# Patient Record
Sex: MALE | Race: BLACK OR AFRICAN AMERICAN | Employment: FULL TIME | URBAN - METROPOLITAN AREA
[De-identification: names, ages, dates, MRNs, and addresses within clinical notes are randomized per-mention and may not be internally consistent; named-entity substitution may affect disease eponyms.]

---

## 2024-01-30 RX ORDER — AMLODIPINE BESYLATE 5 MG/1
5 TABLET ORAL EVERY MORNING
COMMUNITY

## 2024-01-30 RX ORDER — SPIRONOLACTONE 25 MG/1
25 TABLET ORAL DAILY
COMMUNITY

## 2024-02-02 NOTE — H&P
History and Physical    Patient: Sumeet Mitchell  Medical Record Number: [id number]    [facility]   Sumeet Mitchell is a 68 year old male who presents with chronic right shoulder pain.  Patient enjoys working out and playing sports.  Pain began 4 years ago.  No injury.  Pain worse with overhead and behind back activities.  Pain at night causing him not to sleep occasionally.  Decreased range of motion is what bothers him the most.  He is also unable to do activities in the gym because of weakness and decreased range of motion.     Treatment: Cortisone injection 6/23/2023 provided 80% relief for 2 months.  He reports since his last visit he has lost a lot of range of motion. He needs to be able to climb a ladder for his job, and he is not able to do that at this time.    REVIEW OF SYSTEMS   GENERAL HEALTH: Denies fevers, chills, recent weight loss, unremitting night pain, difficulty sleeping, fatigue   SKIN: Denies history of skin rashes, easy bruising, swollen ankles   EYES: Denies blurred vision, double vision, or changes in vision   HEENT: Denies swallowing or hearing difficulties   RESPIRATORY: Denies shortness of breath  CARDIOVASCULAR: Denies chest pain or palpitations   GI: Denies nausea, vomiting, heartburn, constipation, diarrhea, rectal bleeding, melena  : Denies dysuria, urgency, frequency, hematuria, increase in night urination, inability to completely empty bladder   NEURO: Denies balance problems, seizures, increased clumsiness, dropping things, tremor, headaches/ migraines   PSYCH: Denies depression, no anxiety.   HEMATOLOGY: Denies history of bruising, excessive bleeding, or anemia  ENDOCRINE: Denies excessive thirst or urination, unexpected weight changes  MUSCULOSKELETAL: As per HPI  ALLERGY/IMM.: Denies food or seasonal allergies     PMH     Past Medical History:   Diagnosis Date    Diabetes (HCC)     Heart attack (HCC)     Sleep apnea     uses cpap      Current Outpatient Medications   Medication Sig  Dispense Refill    ACETAMINOPHEN EXTRA STRENGTH 500 MG Oral Tab Take 1,000 mg by mouth every 6 (six) hours as needed.      albuterol 108 (90 Base) MCG/ACT Inhalation Aero Soln       amLODIPine 5 MG Oral Tab       aspirin 81 MG Oral Chew Tab Chew 81 mg by mouth daily.      cyclobenzaprine 10 MG Oral Tab Take 1 tablet by mouth 3 (three) times daily as needed.      SOLIQUA 100-33 UNT-MCG/ML Subcutaneous Solution Pen-injector       spironolactone 25 MG Oral Tab Take 25 mg by mouth daily.      ticagrelor 60 MG Oral Tab Take 1 tablet by mouth Q12H.      atorvastatin 80 MG Oral Tab Take 1 tablet by mouth every evening.         No Known Allergies   Past Surgical History:   Procedure Laterality Date    CATH BARE METAL STENT (BMS)      PATIENT DENIES ANY SURGICAL HISTORY        Social History    Tobacco Use      Smoking status: Not on file      Smokeless tobacco: Not on file    Alcohol use: Not on file    Drug use: Not on file       EXAM   General: alert and oriented x 3, well developed, well nourished and in no apparent distress  Head/neck: Normocephalic, atraumatic  Eyes: EOMI  Skin: Skin intact. No lacerations, abrasions  Cardiac: RRR. S1 S2. No murmurs, rubs, gallops  Pulmonary: Normal respirations. CTA bilaterally  MSK: Right Upper Extremity:  No erythema/induraiton, no ecchymosis, no Atrophy.  Tender to palpation over the biceps. None over AC joint, lateral acromion.  Range of motion:  , ER 45,  IR back pocket (C/L 170/70/L1).  Rotator cuff testin-/5 SS, 5-/5 IS, 5/5 SSc, 5/5 TM.  Vascular: Pulses intact with good perfusion  Neurologic: Sensation intact. Strength 5/5  Psychiatric: Normal mood and affect. Cooperative    IMAGING   X-rays taken in clinic today reviewed with the patient show bone-on-bone osteoarthritic changes of the glenohumeral joint.  Large osteophytes around the humeral head.  No high rising humeral head.  No fractures or dislocations    MRI JOINT UPPER EXT RIGHT WO CONTRAST Impression    1.   Advanced osteoarthrosis of the glenohumeral joint and mild to moderate degree osteoarthrosis of the acromioclavicular joint as fully discussed above.     2.  Small curvilinear high-grade interstitial partial-thickness tear of anterior insertional fibers of the supraspinatus tendon at the footprint.     3.  Moderate to severe tendinosis of the subscapularis tendon.     4.  Mild to moderate degree tendinosis of the intra-articular portion of the long head of the biceps tendon.       ASSESSMENT AND PLAN   Diagnosis:  Arthritis of right glenohumeral joint, Non-traumatic rotator cuff tear, right    Discussion & Treatment Plan:  We discussed conservative/non-operative treatment options, including: activity modification, bracing, analgesics, home exercise program, physical/occupational therapy, corticosteroid injection. Due to the physical exam findings, imaging, severity and longevity of symptoms as well as the lack of improvement with conservative treatment, and after reviewing the risk/benefit and alternatives to treatment options above, the patient has decided to proceed with surgery. Risks and benefits of the procedure were discussed. All questions answered.  Patient understands and agrees with all aspects of the above plan.    Procedure:  Right reverse total shoulder arthroplasty    Dr. Escobar Real  H&P prepared by LORENE Aranda

## 2024-02-05 ENCOUNTER — ANESTHESIA EVENT (OUTPATIENT)
Dept: SURGERY | Facility: HOSPITAL | Age: 69
End: 2024-02-05
Payer: COMMERCIAL

## 2024-02-06 ENCOUNTER — HOSPITAL ENCOUNTER (OUTPATIENT)
Facility: HOSPITAL | Age: 69
Setting detail: HOSPITAL OUTPATIENT SURGERY
Discharge: HOME | End: 2024-02-06
Attending: ORTHOPAEDIC SURGERY | Admitting: ORTHOPAEDIC SURGERY
Payer: COMMERCIAL

## 2024-02-06 ENCOUNTER — ANESTHESIA (OUTPATIENT)
Dept: SURGERY | Facility: HOSPITAL | Age: 69
End: 2024-02-06
Payer: COMMERCIAL

## 2024-02-06 ENCOUNTER — APPOINTMENT (OUTPATIENT)
Dept: GENERAL RADIOLOGY | Facility: HOSPITAL | Age: 69
End: 2024-02-06
Attending: NURSE PRACTITIONER
Payer: COMMERCIAL

## 2024-02-06 VITALS
TEMPERATURE: 98 F | WEIGHT: 221 LBS | SYSTOLIC BLOOD PRESSURE: 127 MMHG | DIASTOLIC BLOOD PRESSURE: 69 MMHG | RESPIRATION RATE: 16 BRPM | HEIGHT: 69 IN | BODY MASS INDEX: 32.73 KG/M2 | OXYGEN SATURATION: 92 % | HEART RATE: 96 BPM

## 2024-02-06 DIAGNOSIS — M12.811 ROTATOR CUFF TEAR ARTHROPATHY OF RIGHT SHOULDER: Primary | ICD-10-CM

## 2024-02-06 DIAGNOSIS — M75.101 ROTATOR CUFF TEAR ARTHROPATHY OF RIGHT SHOULDER: Primary | ICD-10-CM

## 2024-02-06 LAB
GLUCOSE BLDC GLUCOMTR-MCNC: 142 MG/DL (ref 70–99)
GLUCOSE BLDC GLUCOMTR-MCNC: 187 MG/DL (ref 70–99)

## 2024-02-06 PROCEDURE — 88311 DECALCIFY TISSUE: CPT | Performed by: ORTHOPAEDIC SURGERY

## 2024-02-06 PROCEDURE — 97535 SELF CARE MNGMENT TRAINING: CPT

## 2024-02-06 PROCEDURE — 97165 OT EVAL LOW COMPLEX 30 MIN: CPT

## 2024-02-06 PROCEDURE — 73030 X-RAY EXAM OF SHOULDER: CPT | Performed by: NURSE PRACTITIONER

## 2024-02-06 PROCEDURE — 82962 GLUCOSE BLOOD TEST: CPT

## 2024-02-06 PROCEDURE — 0RRJ00Z REPLACEMENT OF RIGHT SHOULDER JOINT WITH REVERSE BALL AND SOCKET SYNTHETIC SUBSTITUTE, OPEN APPROACH: ICD-10-PCS | Performed by: ORTHOPAEDIC SURGERY

## 2024-02-06 PROCEDURE — 88305 TISSUE EXAM BY PATHOLOGIST: CPT | Performed by: ORTHOPAEDIC SURGERY

## 2024-02-06 PROCEDURE — 64415 NJX AA&/STRD BRCH PLXS IMG: CPT | Performed by: ORTHOPAEDIC SURGERY

## 2024-02-06 DEVICE — IMPLANTABLE DEVICE: Type: IMPLANTABLE DEVICE | Site: SHOULDER | Status: FUNCTIONAL

## 2024-02-06 DEVICE — SCREW BN 36MM 4.5MM PERI STRL: Type: IMPLANTABLE DEVICE | Site: SHOULDER | Status: FUNCTIONAL

## 2024-02-06 DEVICE — IMPLANTABLE DEVICE: Type: IMPLANTABLE DEVICE | Site: SHOULDER

## 2024-02-06 DEVICE — SCREW BN 40MM 4.5MM PERI STRL: Type: IMPLANTABLE DEVICE | Site: SHOULDER | Status: FUNCTIONAL

## 2024-02-06 DEVICE — SCREW BN 20MM 5.5MM LOCK PERI: Type: IMPLANTABLE DEVICE | Site: SHOULDER | Status: FUNCTIONAL

## 2024-02-06 DEVICE — SCREW BN 16MM 5.5MM LOCK PERI: Type: IMPLANTABLE DEVICE | Site: SHOULDER | Status: FUNCTIONAL

## 2024-02-06 RX ORDER — OXYCODONE HYDROCHLORIDE 5 MG/1
5 TABLET ORAL EVERY 4 HOURS PRN
OUTPATIENT
Start: 2024-02-06 | End: 2024-02-07

## 2024-02-06 RX ORDER — OXYCODONE HYDROCHLORIDE 5 MG/1
5 TABLET ORAL EVERY 4 HOURS PRN
Status: DISCONTINUED | OUTPATIENT
Start: 2024-02-06 | End: 2024-02-06

## 2024-02-06 RX ORDER — METOPROLOL TARTRATE 1 MG/ML
INJECTION, SOLUTION INTRAVENOUS AS NEEDED
Status: DISCONTINUED | OUTPATIENT
Start: 2024-02-06 | End: 2024-02-06 | Stop reason: SURG

## 2024-02-06 RX ORDER — SODIUM CHLORIDE, SODIUM LACTATE, POTASSIUM CHLORIDE, CALCIUM CHLORIDE 600; 310; 30; 20 MG/100ML; MG/100ML; MG/100ML; MG/100ML
INJECTION, SOLUTION INTRAVENOUS CONTINUOUS
Status: DISCONTINUED | OUTPATIENT
Start: 2024-02-06 | End: 2024-02-06

## 2024-02-06 RX ORDER — FAMOTIDINE 20 MG/1
20 TABLET, FILM COATED ORAL ONCE
Status: COMPLETED | OUTPATIENT
Start: 2024-02-06 | End: 2024-02-06

## 2024-02-06 RX ORDER — METOCLOPRAMIDE 10 MG/1
10 TABLET ORAL ONCE
Status: COMPLETED | OUTPATIENT
Start: 2024-02-06 | End: 2024-02-06

## 2024-02-06 RX ORDER — NALOXONE HYDROCHLORIDE 0.4 MG/ML
0.08 INJECTION, SOLUTION INTRAMUSCULAR; INTRAVENOUS; SUBCUTANEOUS AS NEEDED
Status: DISCONTINUED | OUTPATIENT
Start: 2024-02-06 | End: 2024-02-06

## 2024-02-06 RX ORDER — MORPHINE SULFATE 4 MG/ML
2 INJECTION, SOLUTION INTRAMUSCULAR; INTRAVENOUS EVERY 2 HOUR PRN
OUTPATIENT
Start: 2024-02-06 | End: 2024-02-07

## 2024-02-06 RX ORDER — LIDOCAINE HYDROCHLORIDE 10 MG/ML
INJECTION, SOLUTION EPIDURAL; INFILTRATION; INTRACAUDAL; PERINEURAL AS NEEDED
Status: DISCONTINUED | OUTPATIENT
Start: 2024-02-06 | End: 2024-02-06 | Stop reason: SURG

## 2024-02-06 RX ORDER — ASPIRIN 81 MG/1
81 TABLET ORAL 2 TIMES DAILY
Qty: 42 TABLET | Refills: 0 | Status: SHIPPED | OUTPATIENT
Start: 2024-02-06 | End: 2024-02-27

## 2024-02-06 RX ORDER — ACETAMINOPHEN 500 MG
1000 TABLET ORAL ONCE
Status: COMPLETED | OUTPATIENT
Start: 2024-02-06 | End: 2024-02-06

## 2024-02-06 RX ORDER — TRANEXAMIC ACID 10 MG/ML
INJECTION, SOLUTION INTRAVENOUS AS NEEDED
Status: DISCONTINUED | OUTPATIENT
Start: 2024-02-06 | End: 2024-02-06 | Stop reason: SURG

## 2024-02-06 RX ORDER — DOCUSATE SODIUM 100 MG/1
100 CAPSULE, LIQUID FILLED ORAL 2 TIMES DAILY
Status: DISCONTINUED | OUTPATIENT
Start: 2024-02-06 | End: 2024-02-06

## 2024-02-06 RX ORDER — DEXTROSE MONOHYDRATE 25 G/50ML
50 INJECTION, SOLUTION INTRAVENOUS
Status: DISCONTINUED | OUTPATIENT
Start: 2024-02-06 | End: 2024-02-06

## 2024-02-06 RX ORDER — DEXAMETHASONE SODIUM PHOSPHATE 10 MG/ML
INJECTION, SOLUTION INTRAMUSCULAR; INTRAVENOUS
Status: COMPLETED | OUTPATIENT
Start: 2024-02-06 | End: 2024-02-06

## 2024-02-06 RX ORDER — PHENYLEPHRINE HCL 10 MG/ML
VIAL (ML) INJECTION AS NEEDED
Status: DISCONTINUED | OUTPATIENT
Start: 2024-02-06 | End: 2024-02-06 | Stop reason: SURG

## 2024-02-06 RX ORDER — LIDOCAINE HYDROCHLORIDE 10 MG/ML
INJECTION, SOLUTION INFILTRATION; PERINEURAL
Status: COMPLETED | OUTPATIENT
Start: 2024-02-06 | End: 2024-02-06

## 2024-02-06 RX ORDER — DOCUSATE SODIUM 100 MG/1
100 CAPSULE, LIQUID FILLED ORAL 2 TIMES DAILY
Qty: 60 CAPSULE | Refills: 0 | Status: SHIPPED | OUTPATIENT
Start: 2024-02-06

## 2024-02-06 RX ORDER — ROCURONIUM BROMIDE 10 MG/ML
INJECTION, SOLUTION INTRAVENOUS AS NEEDED
Status: DISCONTINUED | OUTPATIENT
Start: 2024-02-06 | End: 2024-02-06 | Stop reason: SURG

## 2024-02-06 RX ORDER — MORPHINE SULFATE 4 MG/ML
6 INJECTION, SOLUTION INTRAMUSCULAR; INTRAVENOUS EVERY 2 HOUR PRN
OUTPATIENT
Start: 2024-02-06 | End: 2024-02-07

## 2024-02-06 RX ORDER — NICOTINE POLACRILEX 4 MG
30 LOZENGE BUCCAL
Status: DISCONTINUED | OUTPATIENT
Start: 2024-02-06 | End: 2024-02-06

## 2024-02-06 RX ORDER — CEFAZOLIN SODIUM/WATER 2 G/20 ML
2 SYRINGE (ML) INTRAVENOUS EVERY 8 HOURS
Qty: 40 ML | Refills: 0 | Status: DISCONTINUED | OUTPATIENT
Start: 2024-02-06 | End: 2024-02-06

## 2024-02-06 RX ORDER — HYDROMORPHONE HYDROCHLORIDE 1 MG/ML
0.2 INJECTION, SOLUTION INTRAMUSCULAR; INTRAVENOUS; SUBCUTANEOUS EVERY 5 MIN PRN
Status: DISCONTINUED | OUTPATIENT
Start: 2024-02-06 | End: 2024-02-06

## 2024-02-06 RX ORDER — ASPIRIN 81 MG/1
81 TABLET ORAL 2 TIMES DAILY
Status: DISCONTINUED | OUTPATIENT
Start: 2024-02-06 | End: 2024-02-06

## 2024-02-06 RX ORDER — BUPIVACAINE HYDROCHLORIDE AND EPINEPHRINE 5; 5 MG/ML; UG/ML
INJECTION, SOLUTION PERINEURAL AS NEEDED
Status: DISCONTINUED | OUTPATIENT
Start: 2024-02-06 | End: 2024-02-06 | Stop reason: HOSPADM

## 2024-02-06 RX ORDER — ONDANSETRON 2 MG/ML
INJECTION INTRAMUSCULAR; INTRAVENOUS AS NEEDED
Status: DISCONTINUED | OUTPATIENT
Start: 2024-02-06 | End: 2024-02-06 | Stop reason: SURG

## 2024-02-06 RX ORDER — OXYCODONE HYDROCHLORIDE 5 MG/1
2.5 TABLET ORAL EVERY 4 HOURS PRN
Status: DISCONTINUED | OUTPATIENT
Start: 2024-02-06 | End: 2024-02-06

## 2024-02-06 RX ORDER — HYDROMORPHONE HYDROCHLORIDE 1 MG/ML
0.6 INJECTION, SOLUTION INTRAMUSCULAR; INTRAVENOUS; SUBCUTANEOUS EVERY 5 MIN PRN
Status: DISCONTINUED | OUTPATIENT
Start: 2024-02-06 | End: 2024-02-06

## 2024-02-06 RX ORDER — CEFAZOLIN SODIUM/WATER 2 G/20 ML
2 SYRINGE (ML) INTRAVENOUS ONCE
Status: COMPLETED | OUTPATIENT
Start: 2024-02-06 | End: 2024-02-06

## 2024-02-06 RX ORDER — OXYCODONE HYDROCHLORIDE 5 MG/1
10 TABLET ORAL EVERY 4 HOURS PRN
OUTPATIENT
Start: 2024-02-06 | End: 2024-02-07

## 2024-02-06 RX ORDER — ACETAMINOPHEN 500 MG
1000 TABLET ORAL EVERY 8 HOURS
OUTPATIENT
Start: 2024-02-06 | End: 2024-02-07

## 2024-02-06 RX ORDER — ROPIVACAINE HYDROCHLORIDE 5 MG/ML
INJECTION, SOLUTION EPIDURAL; INFILTRATION; PERINEURAL
Status: COMPLETED | OUTPATIENT
Start: 2024-02-06 | End: 2024-02-06

## 2024-02-06 RX ORDER — NICOTINE POLACRILEX 4 MG
15 LOZENGE BUCCAL
Status: DISCONTINUED | OUTPATIENT
Start: 2024-02-06 | End: 2024-02-06

## 2024-02-06 RX ORDER — VANCOMYCIN HYDROCHLORIDE 1 G/20ML
INJECTION, POWDER, LYOPHILIZED, FOR SOLUTION INTRAVENOUS AS NEEDED
Status: DISCONTINUED | OUTPATIENT
Start: 2024-02-06 | End: 2024-02-06 | Stop reason: HOSPADM

## 2024-02-06 RX ORDER — SENNOSIDES 8.6 MG
17.2 TABLET ORAL NIGHTLY
Status: DISCONTINUED | OUTPATIENT
Start: 2024-02-06 | End: 2024-02-06

## 2024-02-06 RX ORDER — MORPHINE SULFATE 4 MG/ML
4 INJECTION, SOLUTION INTRAMUSCULAR; INTRAVENOUS EVERY 10 MIN PRN
Status: DISCONTINUED | OUTPATIENT
Start: 2024-02-06 | End: 2024-02-06

## 2024-02-06 RX ORDER — HYDROMORPHONE HYDROCHLORIDE 1 MG/ML
0.4 INJECTION, SOLUTION INTRAMUSCULAR; INTRAVENOUS; SUBCUTANEOUS EVERY 5 MIN PRN
Status: DISCONTINUED | OUTPATIENT
Start: 2024-02-06 | End: 2024-02-06

## 2024-02-06 RX ORDER — MORPHINE SULFATE 15 MG/1
15 TABLET ORAL EVERY 4 HOURS PRN
OUTPATIENT
Start: 2024-02-06 | End: 2024-02-07

## 2024-02-06 RX ORDER — FAMOTIDINE 10 MG/ML
20 INJECTION, SOLUTION INTRAVENOUS ONCE
Status: COMPLETED | OUTPATIENT
Start: 2024-02-06 | End: 2024-02-06

## 2024-02-06 RX ORDER — MORPHINE SULFATE 4 MG/ML
4 INJECTION, SOLUTION INTRAMUSCULAR; INTRAVENOUS EVERY 2 HOUR PRN
OUTPATIENT
Start: 2024-02-06 | End: 2024-02-07

## 2024-02-06 RX ORDER — MIDAZOLAM HYDROCHLORIDE 1 MG/ML
INJECTION INTRAMUSCULAR; INTRAVENOUS
Status: COMPLETED | OUTPATIENT
Start: 2024-02-06 | End: 2024-02-06

## 2024-02-06 RX ORDER — MORPHINE SULFATE 10 MG/ML
6 INJECTION, SOLUTION INTRAMUSCULAR; INTRAVENOUS EVERY 10 MIN PRN
Status: DISCONTINUED | OUTPATIENT
Start: 2024-02-06 | End: 2024-02-06

## 2024-02-06 RX ORDER — DEXAMETHASONE SODIUM PHOSPHATE 4 MG/ML
VIAL (ML) INJECTION AS NEEDED
Status: DISCONTINUED | OUTPATIENT
Start: 2024-02-06 | End: 2024-02-06 | Stop reason: SURG

## 2024-02-06 RX ORDER — METOCLOPRAMIDE HYDROCHLORIDE 5 MG/ML
10 INJECTION INTRAMUSCULAR; INTRAVENOUS ONCE
Status: COMPLETED | OUTPATIENT
Start: 2024-02-06 | End: 2024-02-06

## 2024-02-06 RX ORDER — HYDROCODONE BITARTRATE AND ACETAMINOPHEN 10; 325 MG/1; MG/1
1 TABLET ORAL EVERY 6 HOURS PRN
Qty: 28 TABLET | Refills: 0 | Status: SHIPPED | OUTPATIENT
Start: 2024-02-06 | End: 2024-02-13

## 2024-02-06 RX ORDER — ONDANSETRON 4 MG/1
4 TABLET, ORALLY DISINTEGRATING ORAL EVERY 8 HOURS PRN
Qty: 15 TABLET | Refills: 0 | Status: SHIPPED | OUTPATIENT
Start: 2024-02-06 | End: 2024-02-11

## 2024-02-06 RX ORDER — ONDANSETRON 2 MG/ML
4 INJECTION INTRAMUSCULAR; INTRAVENOUS EVERY 6 HOURS PRN
Status: DISCONTINUED | OUTPATIENT
Start: 2024-02-06 | End: 2024-02-06

## 2024-02-06 RX ORDER — MORPHINE SULFATE 4 MG/ML
2 INJECTION, SOLUTION INTRAMUSCULAR; INTRAVENOUS EVERY 10 MIN PRN
Status: DISCONTINUED | OUTPATIENT
Start: 2024-02-06 | End: 2024-02-06

## 2024-02-06 RX ORDER — GLYCOPYRROLATE 0.2 MG/ML
INJECTION, SOLUTION INTRAMUSCULAR; INTRAVENOUS AS NEEDED
Status: DISCONTINUED | OUTPATIENT
Start: 2024-02-06 | End: 2024-02-06 | Stop reason: SURG

## 2024-02-06 RX ORDER — LABETALOL HYDROCHLORIDE 5 MG/ML
INJECTION, SOLUTION INTRAVENOUS AS NEEDED
Status: DISCONTINUED | OUTPATIENT
Start: 2024-02-06 | End: 2024-02-06 | Stop reason: SURG

## 2024-02-06 RX ADMIN — ONDANSETRON 4 MG: 2 INJECTION INTRAMUSCULAR; INTRAVENOUS at 07:25:00

## 2024-02-06 RX ADMIN — SODIUM CHLORIDE, SODIUM LACTATE, POTASSIUM CHLORIDE, CALCIUM CHLORIDE: 600; 310; 30; 20 INJECTION, SOLUTION INTRAVENOUS at 10:41:00

## 2024-02-06 RX ADMIN — MIDAZOLAM HYDROCHLORIDE 2 MG: 1 INJECTION INTRAMUSCULAR; INTRAVENOUS at 07:10:00

## 2024-02-06 RX ADMIN — CEFAZOLIN SODIUM/WATER 2 G: 2 G/20 ML SYRINGE (ML) INTRAVENOUS at 07:47:00

## 2024-02-06 RX ADMIN — DEXAMETHASONE SODIUM PHOSPHATE 10 MG: 10 INJECTION, SOLUTION INTRAMUSCULAR; INTRAVENOUS at 07:13:00

## 2024-02-06 RX ADMIN — PHENYLEPHRINE HCL 100 MCG: 10 MG/ML VIAL (ML) INJECTION at 07:38:00

## 2024-02-06 RX ADMIN — ROCURONIUM BROMIDE 40 MG: 10 INJECTION, SOLUTION INTRAVENOUS at 07:39:00

## 2024-02-06 RX ADMIN — ROCURONIUM BROMIDE 20 MG: 10 INJECTION, SOLUTION INTRAVENOUS at 09:00:00

## 2024-02-06 RX ADMIN — LIDOCAINE HYDROCHLORIDE 5 ML: 10 INJECTION, SOLUTION INFILTRATION; PERINEURAL at 07:10:00

## 2024-02-06 RX ADMIN — ROCURONIUM BROMIDE 10 MG: 10 INJECTION, SOLUTION INTRAVENOUS at 07:33:00

## 2024-02-06 RX ADMIN — LIDOCAINE HYDROCHLORIDE 50 MG: 10 INJECTION, SOLUTION EPIDURAL; INFILTRATION; INTRACAUDAL; PERINEURAL at 07:33:00

## 2024-02-06 RX ADMIN — DEXAMETHASONE SODIUM PHOSPHATE 4 MG: 4 MG/ML VIAL (ML) INJECTION at 07:25:00

## 2024-02-06 RX ADMIN — GLYCOPYRROLATE 0.2 MG: 0.2 INJECTION, SOLUTION INTRAMUSCULAR; INTRAVENOUS at 07:25:00

## 2024-02-06 RX ADMIN — ROPIVACAINE HYDROCHLORIDE 30 ML: 5 INJECTION, SOLUTION EPIDURAL; INFILTRATION; PERINEURAL at 07:13:00

## 2024-02-06 RX ADMIN — TRANEXAMIC ACID 1000 MG: 10 INJECTION, SOLUTION INTRAVENOUS at 07:40:00

## 2024-02-06 RX ADMIN — METOPROLOL TARTRATE 2.5 MG: 1 INJECTION, SOLUTION INTRAVENOUS at 09:02:00

## 2024-02-06 RX ADMIN — LABETALOL HYDROCHLORIDE 5 MG: 5 INJECTION, SOLUTION INTRAVENOUS at 10:26:00

## 2024-02-06 NOTE — ANESTHESIA POSTPROCEDURE EVALUATION
Patient: Sumeet Mitchell    Procedure Summary       Date: 02/06/24 Room / Location: [facility] MAIN OR 09 / [facility] MAIN OR    Anesthesia Start: 0725 Anesthesia Stop:     Procedure: Right reverse total shoulder arthroplasty (Right: Shoulder) Diagnosis: (Arthritis of right glenohumeral joint, nontraumatic incomplete tear of right rotator cuff)    Surgeons: Escobar Real MD Anesthesiologist: Keerthi Tariq MD    Anesthesia Type: general ASA Status: 3            Anesthesia Type: general    Vitals Value Taken Time   /78 02/06/24 1041   Temp 97.5 °F (36.4 °C) 02/06/24 1041   Pulse 99 02/06/24 1042   Resp 17 02/06/24 1042   SpO2 94 % 02/06/24 1042   Vitals shown include unfiled device data.    [facility] AN Post Evaluation:   Patient Evaluated in PACU  Patient Participation: complete - patient participated  Level of Consciousness: sleepy but conscious  Pain Score: 0  Pain Management: adequate  Airway Patency:patent  Dental exam unchanged from preop  Yes    Cardiovascular Status: hemodynamically stable  Respiratory Status: nasal cannula  Postoperative Hydration acceptable      Shrei Mcdonald CRNA  2/6/2024 10:42 AM

## 2024-02-06 NOTE — ANESTHESIA PREPROCEDURE EVALUATION
Anesthesia PreOp Note    HPI:     Sumeet Mitchell is a 68 year old male who presents for preoperative consultation requested by: Escobar Real MD    Date of Surgery: 2/6/2024    Procedure(s):  Right reverse total shoulder arthroplasty  Indication: Arthritis of right glenohumeral joint, nontraumatic incomplete tear of right rotator cuff    Relevant Problems   No relevant active problems       NPO:  Last Liquid Consumption Date: 02/06/24  Last Liquid Consumption Time: 0400  Last Solid Consumption Date: 02/05/24  Last Solid Consumption Time: 2200  Last Liquid Consumption Date: 02/06/24          History Review:  There are no problems to display for this patient.      Past Medical History:   Diagnosis Date    Diabetes (HCC)     Heart attack (HCC)     Sleep apnea     uses cpap       Past Surgical History:   Procedure Laterality Date    CATH BARE METAL STENT (BMS)      PATIENT DENIES ANY SURGICAL HISTORY         Medications Prior to Admission   Medication Sig Dispense Refill Last Dose    ticagrelor 60 MG Oral Tab Take 1 tablet (60 mg total) by mouth 2 (two) times daily.   1/23/2024    Insulin Glargine-Lixisenatide (SOLIQUA SC) Inject 60 mg into the skin every morning.   2/5/2024 at 1000    spironolactone 25 MG Oral Tab Take 1 tablet (25 mg total) by mouth daily.   Past Month    amLODIPine 5 MG Oral Tab Take 1 tablet (5 mg total) by mouth every morning.   Past Month    sacubitril-valsartan 24-26 MG Oral Tab Take 1 tablet by mouth 2 (two) times daily.   Past Month     Current Facility-Administered Medications Ordered in Epic   Medication Dose Route Frequency Provider Last Rate Last Admin    lactated ringers infusion   Intravenous Continuous Escobar Real MD 20 mL/hr at 02/06/24 0624 New Bag at 02/06/24 0624    ceFAZolin (Ancef) 2 g in 20mL IV syringe premix  2 g Intravenous Once Escobar Real MD         No current [facility]-ordered outpatient medications on file.       No Known Allergies    History reviewed. No pertinent  family history.  Social History     Socioeconomic History    Marital status: Single   Tobacco Use    Smoking status: Never    Smokeless tobacco: Never   Vaping Use    Vaping Use: Never used   Substance and Sexual Activity    Alcohol use: Not Currently     Comment: socially    Drug use: Never       Available pre-op labs reviewed.     Lab Results   Component Value Date    PGLU 142 (H) 02/06/2024          Vital Signs:  Body mass index is 32.64 kg/m².   height is 1.753 m (5' 9\") and weight is 100.2 kg (221 lb). His oral temperature is 97.6 °F (36.4 °C). His blood pressure is 149/80 and his pulse is 99. His respiration is 16 and oxygen saturation is 93%.   Vitals:    01/30/24 1207 02/06/24 0557   BP:  149/80   Pulse:  99   Resp:  16   Temp:  97.6 °F (36.4 °C)   TempSrc:  Oral   SpO2:  93%   Weight: 99.8 kg (220 lb) 100.2 kg (221 lb)   Height: 1.753 m (5' 9\")         Anesthesia Evaluation     Patient summary reviewed and Nursing notes reviewed    No history of anesthetic complications   Airway   Mallampati: III  TM distance: >3 FB  Neck ROM: full  Dental - Dentition appears grossly intact     Pulmonary - normal exam   (+) sleep apnea on CPAP  Cardiovascular - normal exam  Exercise tolerance: good  (+) past MI, CHF (EF ~35-40%)    ROS comment: 12/2023 stress test  SUMMARY:  1.  No reversible myocardial ischemia was identified on the study.  2.  There is fixed perfusion defect in the mid inferior, basal inferior,  mid inferolateral and basal inferolateral wall segments, with a summed  rest score of 11.  3.  There is abnormal left ventricular systolic function on gated study in  the inferior wall, with ejection fraction calculated to be 48%.  Stress  nuclear imaging scan remains unremarkable for cardiac ischemia.     12/2023 echo  EF 30 to 35%, mild diastolic dysfunction, mild AR, trace MR and TR    Neuro/Psych      GI/Hepatic/Renal      Endo/Other    (+) diabetes mellitus  Abdominal                  Anesthesia Plan:   ASA:   3  Plan:   General  Airway:  ETT  Post-op Pain Management: Interscalene block  Informed Consent Plan and Risks Discussed With:  Patient  Discussed plan with:  CRNA      I have informed Sumeet Mitchell and/or legal guardian or family member of the nature of the anesthetic plan, benefits, risks including possible dental damage if relevant, major complications, and any alternative forms of anesthetic management.   All of the patient's questions were answered to the best of my ability. The patient desires the anesthetic management as planned.  Keerthi Tariq MD  2/6/2024 6:49 AM  Present on Admission:  **None**

## 2024-02-06 NOTE — ANESTHESIA PROCEDURE NOTES
Peripheral Block    Date/Time: 2/6/2024 7:10 AM    Performed by: Keerthi Tariq MD  Authorized by: Keerthi Tariq MD      General Information and Staff    Start Time:  2/6/2024 7:10 AM  End Time:  2/6/2024 7:13 AM  Anesthesiologist:  Keerthi Tariq MD  Performed by:  Anesthesiologist  Patient Location:  PACU    Block Placement: Pre Induction  Site Identification: real time ultrasound guided and image stored and retrievable      Reason for Block: at surgeon's request and post-op pain management    Preanesthetic Checklist: 2 patient identifers, IV checked, risks and benefits discussed, monitors and equipment checked, pre-op evaluation, timeout performed, anesthesia consent and sterile technique used      Procedure Details    Patient Position:  Sitting  Prep: ChloraPrep    Monitoring:  Cardiac monitor  Block Type:  Interscalene  Laterality:  Right  Injection Technique:  Single-shot    Needle    Needle Type:  Short-bevel  Needle Gauge:  22 G  Needle Length:  50 mm  Needle Localization:  Ultrasound guidance  Reason for Ultrasound Use: appropriate spread of the medication was noted in real time and no ultrasound evidence of intravascular and/or intraneural injection            Assessment    Injection Assessment:  Good spread noted, incremental injection, local visualized surrounding nerve on ultrasound, low pressure, negative aspiration for heme and no pain on injection  Heart Rate Change: No        Medications  2/6/2024 7:10 AM  midazolam (VERSED)injection 2mg/2ml - Intravenous   2 mg - 2/6/2024 7:10:00 AM  lidocaine injection 1% - Intradermal   5 mL - 2/6/2024 7:10:00 AM  ropivacaine (NAROPIN) injection 0.5% - Infiltration   30 mL - 2/6/2024 7:13:00 AM  dexamethasone (DECADRON) PF injection 10 mg/ml - Injection   10 mg - 2/6/2024 7:13:00 AM    Additional Comments

## 2024-02-06 NOTE — ANESTHESIA PROCEDURE NOTES
Airway  Date/Time: 2/6/2024 7:35 AM  Urgency: Elective    Airway not difficult    General Information and Staff    Patient location during procedure: OR  Anesthesiologist: Keerthi Tariq MD  Resident/CRNA: Sheri Mcdonald CRNA  Performed: CRNA   Performed by: Sheri Mcdonald CRNA  Authorized by: Keerthi Tariq MD      Indications and Patient Condition  Indications for airway management: anesthesia  Sedation level: deep  Preoxygenated: yes  Patient position: sniffing  Mask difficulty assessment: 0 - not attempted    Final Airway Details  Final airway type: endotracheal airway      Successful airway: ETT  Cuffed: yes   Successful intubation technique: Video laryngoscopy  Facilitating devices/methods: intubating stylet  Endotracheal tube insertion site: oral  Blade: GlideScope  Blade size: #4  ETT size (mm): 7.5    Cormack-Lehane Classification: grade I - full view of glottis  Placement verified by: capnometry   Measured from: teeth  ETT to teeth (cm): 23  Number of attempts at approach: 1    Additional Comments  Easy/atraumatic intubation. (+) ETCO23 and BBSE. ETT secured. Soft bite block placed.

## 2024-02-06 NOTE — INTERVAL H&P NOTE
Pre-op Diagnosis: Arthritis of right glenohumeral joint, nontraumatic incomplete tear of right rotator cuff    The above referenced H&P was reviewed by Dru Flood on 2/6/2024, the patient was examined and no significant changes have occurred in the patient's condition since the H&P was performed.  I discussed with the patient and/or legal representative the potential benefits, risks and side effects of this procedure; the likelihood of the patient achieving goals; and potential problems that might occur during recuperation.  I discussed reasonable alternatives to the procedure, including risks, benefits and side effects related to the alternatives and risks related to not receiving this procedure.  We will proceed with procedure as planned.

## 2024-02-06 NOTE — DISCHARGE INSTRUCTIONS
-------------------------------------------------------------------------------------------------------------------------------------------  -------------------------------------------------------------------------------------------------------------------------------------------    CALL 911 If you experience:  Chest pain  Trouble breathing  Excessive swelling of the arm  Your family notices you are becoming lethargic or confused        Escobar Real MD  [address] [place]   [address]  Phone:   [phone]  [email or web address]        Shoulder Replacement Discharge Instructions  (Reverse/Anatomic/Roly-Arthroplasty)      PAIN MANAGEMENT    INTERSCALENE NERVE BLOCK  The Anesthesiologist may have performed a Nerve Block, if you had agreed to it before the surgery. A combination of local anesthetics (numbing medicines) are used to numb your shoulder and arm so your brain will not receive any pain signals during and immediately after surgery.  The length of effect varies from person to person, but the nerve block usually provides 12-24 hours of pain relief.  You will notice a gradual increase in pain as this begins to wear off. You may feel tingling, burning, electric shocks, pins and needles or many other strange sensations as your arm is \"waking up\". You may want to take a pain medication pill before the nerve block completely wears off. People usually start to get their feeling back before they have return of ability to move their elbow, wrist and hand.    PAIN MEDICATIONS:  Acetaminophen 500 mg (also known as Tylenol)  When your nerve block is beginning to wear off and you begin to have sensations in your arm, you may start taking your pain medications  You may continue to take one tablet every 6 hours for the next 3 days, or longer if needed.  Hydrocodone or Oxycodone (Narcotic pain medication)  If the Tylenol is not controlling your pain, you may take narcotic pain medication as prescribed, if  needed  If you were prescribed Norco 10/325, you may break the tablet in half if you wish, for half the dose.  The narcotic pain medication also contains acetaminophen and should be taken in place of a dose of acetaminophen 500.  Narcotic pain medication can cause nausea. Recommend taking one dose of nausea medication 30 minutes prior.  Take with food to prevent upset stomach  Can cause constipation. Please drink plenty of water, eat fruit/vegetables, take a fiber supplement or use the prescribed laxatives.  Taking narcotic pain medication while using alcohol increases your risk for trouble breathing or becoming confused  Please tell your doctor if oyu are also taking anxiety medication (e.g. Xanax) or nerve pain medication (e.g. gabapentin) as mixing these medications increases your risk for trouble breathing or becoming confused  You should not drive if you are taking narcotic pain medication due to causing drowsiness or difficulty concentrating      OTHER MEDICATIONS    NAUSEA MEDICATION:  Ondansetron 4mg (Zofran)  Nausea medication should be taken 30 minutes before narcotic pain medication, if needed  You may use one tablet every 6 hours, if needed.  Remove the tablet from the foil packaging and place it under your tongue. Let the tablet melt and hold under tongue for 1 minute. After one minute, you can swallow the saliva. It usually is grape flavored. Putting it under the tongue, allows the medication to work faster.  Medication is optional. Only use if having nausea.    STOOL SOFTENERS:  Senokot-S (Docusate 50mg/Sennosides 8.6mg)  This medication contains 2 medications: Docusate and Sennosides. Depending on your insurance, we may be required to prescribe these medications separately. Start by taking 2 tablets at night. If not effective after 2 nights, you may add an additional 1 or 2 tablets in the morning as well. Call if no bowel movement after 3 nights.    Docusate 100mg (also known as Colace)  Works by  increasing the amount of water and fat in your stool, to soften it. This medication usually takes 1-3 days to take effect.  If prescribed separately, take 1 capsule, morning and evening until bowel movement back to your normal.   Sennosides 8.6mg (also known as Senokot or Ex-lax)  Works by stimulating your bowel to move faster.  Anesthesia and Narcotic pain medication can slow down your bowels.  Start by taking 2 tablets at night. If not effective after 2 nights, you may add an additional 1 or 2 tablets in the morning as well.  Call if no bowel movement after 3 nights.  Please drink plenty of water, eat fruit/vegetables, take a fiber supplement or use the prescribed laxatives.    BLOOD THINNERS:  ASPIRIN 18 mg  If you are not using other prescribed blood-thinner, you should begin taking Aspirin the first night of surgery.  Used to decrease your risk of developing a blood clot in your arm  Please take one tablet, every 12 hours  XARELTO / ELIQUIS / WARFARIN / OTHERS  If you are on long-term use of blood thinners, you should have discussed with your prescribing doctor, when it is safe for you to restart your medication  If you will be taking any blood-thinner, do not take Aspirin as this may cause increased risk of unsafe bleeding.  If you will be taking any blood -thinner, do not use Advil, Aleve, Naproxen, Motrin, Mobic, Meloxicam, Celebrex, as they may cause increased risk of unsafe bleeding.      ACTIVITY INSTRUCTIONS    SLING WITH PILLOW:  Use of sling is required for 4 weeks  You will need to wear the sling at all times, even when sleeping.  You are allowed to sleep on your back or even on your side as long as the sling is in place, but many patients find it more comfortable to sleep in a recliner or with pillows behind their back.  While seated, you may unbuckle the neck strap, as long as you keep the stomach strap and pillow buckled in place.  You may remove the sling to shower, get dressed, and do your  exercises. When the sling is off, you may let your arm hang straight down at the side. If you bend forward/down at your waist, this will allow your arm to swing in front of your body, allowing you to wash and dry under your arm/armpit.  While your arm is hanging down, you are encouraged to bend your elbow open and closed to prevent stiffness.  While in the sling, you are encouraged to open and close your hand into a tight fist and to move your wrist up and down. You can also spin your hand palm down and palm up to prevent stiffness.  Keep your arm against your stomach, resting on your leg, or hanging at your side.  Do not rotate your arm out past your side  Do not lift your arm away from the body more than a pillow  Do not rotate your arm behind the back.  We DO NOT recommend driving until you are no longer taking narcotic pain medication and no longer required to wear the sling.  While in the sling, you are allowed to use your elbow/wrist/hand to write, type, knit, play cards, cut your food, brush your teeth and wash your face. You should not actively use your shoulder muscles to do these tasks.    ICE MACHINE  Recommend using 1 hour on, 1 hour off, for the first 2 days after surgery while awake.  Depending on the model ice machine you received, the machine may automatically be programmed to do this.  Do not place pad directly on skin - make sure there is a barrier such as a t-shirt or other thin cloth.  After the first 2 days you may use the ice machine as needed for comfort   If you did not receive the ice machine, you may use ice packs for 20 minutes per hour. Remember to protect your skin with a cloth barrier.      WOUND CARE and BATHING    Dressing:  The dark orange/brown rubbery dressing is splash resistant, but you should not purposely let the shower run directly on the dressing.  Never put your shoulder under water.  Do not attempt to remove or reposition the dressing. Once removed, it does not stick very  well anymore.  The dressing should stay on until your first visit, 7-10 days later.    BATHING  The dark orange/brown rubbery dressing is splash resistant, but you should not purposely let the shower run directly on the dressing.  You should not place your shoulder underwater for at least 4 weeks. Please ask at your 4-6 week visit.  You may remove the sling to shower. When the sling is off, you may let your arm hang straight down at the side. If you bend forward/down at your waist, this will allow your arm to swing in front of your body, allowing you to wash and dry under your arm/armpit.    SUTURE REMOVAL   Dressing and sutures will be removed during your first office visit, 7-10 days after surgery.  We prefer that you return to our office to have your sutures removed so that we can look for any sign of infection or difficulty with wound healing.  If you live a great distance away and are unable to come to our office, you may have a local physician remove your sutures. There should be a small tail of suture at the top and bottom of your incision that looks like fishing line poking out of your skin. We generally pull on the suture and then cut it at the level of the skin. This should cause the remaining suture to retract back into the skin.  The sutures should dissolve with time. If you find that a small area of your wound is not healing, please let our team know.    SIGNS OF INFECTION  Excessive drainage that is soaking through your dressing  Redness that is spreading out from the edges of your incision  Firmness or hardening of the skin associated with the redness  Increased warmth around the surgical area.  Low grade fever, below 100.5 deg, during the first 24 hours after surgery is common. If your temperature persists beyond this time, please call our office.  Please continue to use your breathing device (spirometer) 10 times per hour, each hour while awake, for the first 2 weeks after surgery. Taking deep  breaths opens up your lungs and helps to prevent pneumonia. If you develop a fever 3-5 days after surgery, this may be a pneumonia. You should be evaluated by a medical professional, who may consider a chest Xray.      HOME EXERCISES    You are encouraged to begin home exercises the next day after surgery and continue them for 4 weeks  You may remove the sling to shower, get dressed, and do your exercises. When the sling is off, you may let your arm hang straight down at the side.  - While your arm is hanging down, you are encouraged to bend your elbow open and closed to prevent stiffness. You may do this while seated or standing.  - While in the sling, you are encouraged to open and close your hand into a tight fist  - While in the sling, move your wrist up and down  - While in the sling, spin your hand palm down and palm up to prevent stiffness.  Perform 15 repetitions of each exercise, 3-5 times per day, or more, depending on your level of discomfort.  Keep your arm against your stomach, resting on your leg, or hanging at your side.  Do not rotate or lift your arm away from the body or behind the body.    ELBOW MOTION  Begin next day after surgery  Remove sling and allow arm to rest at your side (you may perform this sitting or standing).  Allow your arm to straighten at the side, then gently bend elbow up.                                 HAND AND WRIST MOTION  Begin next day after surgery  Can be performed while arm is in sling  Curl the fingers into the palm to make a tight fist and then straighten them out.  Move the wrist up and down as far as you can tolerate to prevent stiffness.                                                                            PENDULUM EXERCISES  Begin after your first office visit in 7-10 days  Bend forward at the waist, using a table for support.  Rock your body in a circular pattern to move arm clockwise 10-15 times and then counter-clockwise. If you are unable to rock your  body to make arm circles, you may rock back and forth and then side to side.  Your goal is to make small circles, no larger than 12 inches in diameter                                                   PHYSICAL THERAPY    You may begin formal physical therapy starting 4 weeks after surgery  Our office will provide you with a PT protocol so that your Therapist can follow our rehab instructions  You should receive the PT Protocol at your first office visit in 7-10 days. Please call your therapist as soon as possible to schedule your first visit, as it may take a few weeks to get an appointment.    -------------------------------------------------------------------------------------------------------------------------------------------  -------------------------------------------------------------------------------------------------------------------------------------------                  AMBSUR HOME CARE INSTRUCTIONS: POST-OP ANESTHESIA  The medication that you received for sedation or general anesthesia can last up to 24 hours. Your judgment and reflexes may be altered, even if you feel like your normal self.      We Recommend:   Do not drive any motor vehicle or bicycle   Avoid mowing the lawn, playing sports, or working with power tools/applicances (power saws, electric knives or mixers)   That you have someone stay with you on your first night home   Do not drink alcohol or take sleeping pills or tranquilizers   Do not sign legal documents within 24 hours of your procedure   If you had a nerve block for your surgery, take extra care not to put any pressure on your arm or hand for 24 hours    It is normal:  For you to have a sore throat if you had a breathing tube during surgery (while you were asleep!). The sore throat should get better within 48 hours. You can gargle with warm salt water (1/2 tsp in 4 oz warm water) or use a throat lozenge for comfort  To feel muscle aches or soreness especially in the abdomen,  chest or neck. The achy feeling should go away in the next 24 hours  To feel weak, sleepy or \"wiped out\". Your should start feeling better in the next 24 hours.   To experience mild discomforts such as sore lip or tongue, headache, cramps, gas pains or a bloated feeling in your abdomen.   To experience mild back pain or soreness for a day or two if you had spinal or epidural anesthesia.   If you had laparoscopic surgery, to feel shoulder pain or discomfort on the day of surgery.   For some patients to have nausea after surgery/anesthesia    If you feel nausea or experience vomiting:   Try to move around less.   Eat less than usual or drink only liquids until the next morning   Nausea should resolve in about 24 hours    If you have a problem when you are at home:    Call your surgeons office

## 2024-02-06 NOTE — OCCUPATIONAL THERAPY NOTE
OCCUPATIONAL THERAPY EVALUATION - INPATIENT     [facility]  Evaluation Date: 2/6/2024  Type of Evaluation: Initial    Presenting problem: S/P R Reverse TSA       Physician Order: IP Consult to Occupational Therapy  Reason for Therapy: ADL/IADL Dysfunction and Discharge Planning    OCCUPATIONAL THERAPY ASSESSMENT   Patient is a 68 year old male admitted 2/6/2024 for R Reverse TSA.  Prior to admission, patient's baseline is ind with ADL and mobility.  Patient is currently functioning below baseline with bathing, upper body dressing, and brace management.  Patient is requiring minimal assist as a result of the following impairments: pain and limited R UE ROM. Occupational Therapy will continue to follow for duration of hospitalization.    Patient will benefit from continued skilled OT Services at discharge to promote functional independence and safety with additional support and return home with OP PT    PLAN  OT Treatment Plan: ADL training;Functional transfer training;Patient/Family training;Patient/Family education;Compensatory technique education  OT Device Recommendations: None    OCCUPATIONAL THERAPY MEDICAL/SOCIAL HISTORY   Problem List   Principal Problem:    Rotator cuff tear arthropathy of right shoulder    HOME SITUATION  Type of Home: House (4 exterior stairs)  Home Layout: Two level; Bed/bath upstairs (13 interior stairs)  Lives With: Spouse  Toilet and Equipment: Standard height toilet  Shower/Tub and Equipment: Walk-in shower  Occupation/Status: Retired  Hand Dominance: Right  Drives: Yes  Patient Regularly Uses: None    Stairs in Home: 13 interior stairs  Assistive Device(s) Used: N/A     Prior Level of [place]: This therapist educated pt regarding role of OT in acute care, posterior hip precautions, weight bearing status, use of A/E for LE dressing in order to maintain posterior hip precautions, use of DME, safe use of R/W during ADL's, safe hand placement and safe sit to stand  to sit transfers with proper hand placement while maintaining posterior hip precautions.     SUBJECTIVE  \"I'm not hurting right now\"    OCCUPATIONAL THERAPY EXAMINATION   OBJECTIVE     WEIGHT BEARING RESTRICTION  R Upper Extremity: Non-Weight Bearing    PAIN ASSESSMENT  Ratin      COGNITION  Overall Cognitive Status:  WFL - within functional limits    RANGE OF MOTION   L Upper extremity ROM is within functional limits; R UE NT     STRENGTH ASSESSMENT  L Upper extremity strength is within functional limits; R UE NT     ACTIVITIES OF DAILY LIVING ASSESSMENT  AM-PAC ‘6-Clicks’ Inpatient Daily Activity Short Form  How much help from another person does the patient currently need…  -   Putting on and taking off regular lower body clothing?: None  -   Bathing (including washing, rinsing, drying)?: None  -   Toileting, which includes using toilet, bedpan or urinal? : None  -   Putting on and taking off regular upper body clothing?: A Little  -   Taking care of personal grooming such as brushing teeth?: None  -   Eating meals?: None    AM-PAC Score:  Score: 23  Approx Degree of Impairment: 15.86%  Standardized Score (AM-PAC Scale): 51.12  CMS Modifier (G-Code): CI    FUNCTIONAL TRANSFER ASSESSMENT  Sit to Stand: Edge of Bed  Edge of Bed: Independent    BED MOBILITY  Rolling: Not Tested  Supine to Sit : Modified Independent  Sit to Supine (OT): Not Tested  Scooting: MI    BALANCE ASSESSMENT  Static Sitting: Independent  Static Standing: Independent       Skilled Therapy Provided: ADL training, bed mobility, training pt and spouse in donning/doffing shoulder immobilizer, handout was [id number].    EDUCATION PROVIDED  Patient: Role of Occupational Therapy; Plan of Care; Discharge Recommendations; Functional Transfer Techniques; Fall Prevention; Energy Conservation; Compensatory ADL Techniques  Patient's Response to Education: Returned Demonstration; Verbalized Understanding  Family/Caregiver: Role of Occupational Therapy;  Plan of Care; Discharge Recommendations; Functional Transfer Techniques; Weight Bear Status; Surgical Precautions; Energy Conservation; UE HEP for ROM; Compensatory ADL Techniques  Family/Caregiver's Response to Education: Verbalized Understanding    The patient's Approx Degree of Impairment: 15.86% has been calculated based on documentation in the [facility] '6 clicks' Inpatient Daily Activity Short Form.  Research supports that patients with this level of impairment may benefit from discharge home with spouse and OP PT.  Final disposition will be made by interdisciplinary medical team.    Patient End of Session: In bed;With  staff;Needs met;Call light within reach;Bracing education provided per handout;All patient questions and concerns addressed;Family present    OT Goals  Patient self-stated goal is: return to PLOF     Patient will complete LE dressing with MI  Comment: 2/6 met    Patient will complete toilet transfer with MI  Comment: 2/6 met    Patient will complete self care task at sink level with MI   Comment: 2/6 met    Patient will independently recall reverse TSA precautions  Comment: 2/6 met         Goals  on: 24  Frequency: 1 session; DC OT    Patient Evaluation Complexity Level:   Occupational Profile/Medical History LOW - Brief history including review of medical or therapy records    Specific performance deficits impacting engagement in ADL/IADL LOW  1 - 3 performance deficits    Client Assessment/Performance Deficits LOW - No comorbidities nor modifications of tasks    Clinical Decision Making LOW - Analysis of occupational profile, problem-focused assessments, limited treatment options    Overall Complexity LOW     Self-Care Home Management: 38 minutes      Elisha Fall OTR/L  Occupational Therapy  [facility]

## 2024-02-06 NOTE — OPERATIVE REPORT
Patient Name: Sumeet Mitchell    Procedure Date: 2024     : 10/5/1955    MRN: [id number]    Pre-Operative Diagnosis:   Arthritis of right glenohumeral joint, nontraumatic incomplete tear of right rotator cuff  Biceps tenosynovitis     Post-Operative Diagnosis:   Arthritis of right glenohumeral joint, nontraumatic incomplete tear of right rotator cuff   Biceps tenosynovitis    Surgeon(s) and Role:     * Escobar Real MD - Primary  Assistant(s):  Surgical Assistant.: Iman Doran     Procedure Performed:   1.  right Reverse Total Shoulder Arthroplasty ()  2. Right proximal biceps tenodesis ()    Surgical Assistant.: Iman Doran  2.  right Reverse Total Shoulder Arthroplasty, first assist (72-AS)  2. Right proximal biceps tenodesis, first assist (-AS)    Anesthesia: General; regional; local     Complications: No    Surgical Findings: see below     Specimen: None     Estimated Blood Loss: 300 mL    Condition: Stable to post anesthesia care unit    Operative Indication: Sumeet Mitchell is a 68 year old male who has had right shoulder pain for years. Patient enjoys working out and playing sports. Pain began 4 years ago. No injury. Pain worse with overhead and behind back activities. Pain at night causing him not to sleep. Decreased range of motion. He is also unable to do activities in the gym because of weakness and decreased range of motion.Treatment: Cortisone injection 1 year ago and 2 weeks ago anteriorly which was less effective. The patient failed conservative management and given their known glenohumeral arthritis as well as concern with regards to his future rotator cuff function, a reverse total shoulder arthroplasty was recommended. The procedure as well as its potential risks and postoperative management were discussed. All questions were answered, the patient understands the potential risks, and would like to proceed with the surgical treatment.     Operative Procedure: The  patient was seen in the preoperative holding area where history, physical examination, and radiographic studies were reviewed and the decision was made to proceed with surgery. The operative shoulder was signed. The patient then underwent a regional block as performed by the anesthesia team using ultrasound guidance. The patient was brought back to the operating room, identified, and placed on the operating table. General endotracheal anesthesia was administered. The patient was then placed into a modified beachchair position and the operative shoulder was prepped and draped in usual sterile fashion. Surgical landmarks were outlined on the skin. A surgical timeout confirming the patient's name, date of birth, and operative extremity was performed and all in the room agreed. The area of the incision was anesthetized with local anesthetic. Pre-operative antibiotics were given along with 1g of TXA.     A standard deltopectoral incision was made. The cephalic vein was preserved and taken laterally. The delto-pectoral interval was split and the superior 1/3rd aspect of the pectoralis major insertion was released to aid in exposure. The clavipectoral fascia was incised and the anterior circumflex vessels were tied with #1 vicryl to decrease blood loss.     The long biceps tendon was tenodesed to the superior aspect of the pectoralis major tendon with multiple #2 FiberWire in a figure-of-eight fashion. Excess biceps tendon proximally was removed.     Attention was directed towards the exposure of the glenohumeral joint.The exposure was challenging due to the muscularity of the patient.  The subscapularis was peeled directly off from the lesser tuberosity and tagged with two #2 FiberWire sutures. A capsular release was conducted on the humeral side past the 6 o'clock position and then on the glenoid side from the 12:00 down to the 6 o'clock position. The humerus was dislocated from the glenoid. Large osteophytes were  removed from the neck of the humerus. The guidepin was placed down the canal followed by a cannulated drill and then a hand-held reamer to identify the humeral axis. The head cut was made with 135 degrees of inclination and approximately 20 degrees of retroversion using the guide. A head cut protector was placed.    Attention was directed toward the glenoid side. A 360 degree release of the glenoid was performed. 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 An elevator was used to remove the remaining articular cartilage from the glenoid. The [employer] VIP guide based on the preoperative CT scan was used to place a 2.8 mm guidepin in appropriate position.A 20 degree wedge at the 11:30 aspect of the glenoid showed appropriate fit .The face of the glenoid was first reamed with the central reamer followed by the peripheral reamer. The central peg hole was drilled for 35 mm post.  The final 20-degree augmented glenoid baseplate with +2 mm offset and  35 mm post was impacted into position. The baseplate was fixed with screws with nonlocking screws inferiorly and superiorly and locking screws anterior and posteriorly.  A size 39 mm glenosphere with a +4 mm lateral offset which created a total of +6 mm offset was now impacted into a secure and stable position and secured with the locking screw. Soft tissue and bony debris were irrigated out thoroughly and this completed the glenoid side of the surgery.      Attention was redirected towards the humeral side. The canal was reamed up until appropriate resistance was felt. The humeral canal was then broached up to the above-noted size 10 stem which had excellent rotational stability. A 135 degree, right offset cup was selected. A +3 mm spacer provided excellent stability as well as mobility of the glenohumeral joint. The shoulder was irrigated out thoroughly. The trial components were removed from the humeral side. #5 FiberWire was placed at the rim of the  osteotomy for subscapularis repair. Graft from the resected humeral head was placed into the calcar to allow for bony ingrowth. The final implant implant was impacted into a secure and stable position. The polyethylene liner was impacted and secured, then checked and found to be appropriately engaged. The humerus was reduced onto the glenosphere again with excellent tension and proper stability. The #5 FiberWire sutures were used to repair the subscapularis with the arm held at least 40 degrees of external rotation..The shoulder was then irrigated out thoroughly using a diluted Betadine solution to help sterilize the shoulder. 1 g of vancomycin powder was placed into the deep surgical site.   The deltopectoral interval was closed with #2 Fiberwire in an interrupted fashion. The wound was then closed in layers with 2-0 Monocryl followed by a 3-0 running subcuticular closure of the skin with Steri-Strips. A sterile Aquacel dressing was placed. The arm was placed into a sling with abduction pillow. The patient was extubated and returned to recovery in stable condition.    Iman Doran, surgical assistant, assisted with all aspects of the surgical procedure and was critical in performing the preoperative evaluation, positioning, and assisting during surgery.     Postoperative plan:   The patient will be seen back in the office in 7 to 10 days for dressing removal and wound check.   The patient was given a prescription for pain medication and antinausea medication.  .   The intraoperative findings and postoperative plan will be discussed with the patient and their family.   The patient is allowed elbow wrist and hand range of motion will but will hold from any significant shoulder motion until they are seen back in the office next week  The subscapularis was repaired so the patient will begin internal/external rotation exercises starting at 4 weeks      Implant Name Type Inv. Item Serial No.  Lot No. [id number]  No. Used Action   24MM BASEPLATE, 20 FULL AUG, +2 LAT, ST - SNA  24MM BASEPLATE, 20 FULL AUG, +2 LAT, ST NA [employer]  [id number] Right 1 Implanted   COMP SHANDA UNI REV POR OD35MM - SNA  COMP SHANDA UNI REV POR OD35MM NA [employer]  [id number] Right 1 Implanted   SCREW BN 40MM 4.5MM LUPILLO STRL - SNA  SCREW BN 40MM 4.5MM LUPILLO STRL NA [employer]  [id number] Right 1 Implanted   SPHERE SHANDA [id number] +4 BASEPLT 24MM SHLDR LAT - SNA  SPHERE SHANDA [id number] +4 BASEPLT 24MM SHLDR LAT NA [employer]  [id number] Right 1 Implanted   SCREW BN 36MM 4.5MM LUPILLO STRL - SNA  SCREW BN 36MM 4.5MM LUPILLO STRL NA [employer]  [id number] Right 1 Implanted   SCREW BN 20MM 5.5MM LOCK LUPILLO - SNA  SCREW BN 20MM 5.5MM LOCK LUPILLO NA [employer]  [id number] Right 1 Implanted   SCREW BN 16MM 5.5MM LOCK LUPILLO - SNA  SCREW BN 16MM 5.5MM LOCK LUPILLO NA [employer]  [id number] Right 1 Implanted   LINER HUM M [id number] +3MM OFFSET MED SHLDR - SNA  LINER HUM M [id number] +3MM OFFSET MED SHLDR NA [employer]  [id number] Right 1 Implanted   CUP HUM [id number] +2MM OFFSET R SHLDR SUT - SNA  CUP HUM [id number] +2MM OFFSET R SHLDR SUT NA [employer]  [id number] Right 1 Implanted   UNIV REVERS APEX STEM SIZE 10 - SNA  UNIV REVERS APEX STEM SIZE 10 NA [employer]  [id number] Right 1 Implanted      Escobar Real MD 02/06/24

## (undated) DEVICE — PACK CDS SHOULDER

## (undated) DEVICE — SUTURE STRATAFIX SPRL MCRYL + SZ 2-0 L18IN

## (undated) DEVICE — SET PIN MOD GLEN SYS

## (undated) DEVICE — DRAPE SHEET LG

## (undated) DEVICE — GLOVE GAMMEX PI HYBRID LF 8.5

## (undated) DEVICE — GOWN SURG 2XL LEV 3 BLU W/ GRN NK BND AERO

## (undated) DEVICE — BASE CALIB REUSE FOR VIP 50

## (undated) DEVICE — ARTHX PSTNR IMPL 2.8MM PIN NIT

## (undated) DEVICE — SPONGE LAP 18X18IN 7IN LOOP

## (undated) DEVICE — SUTURE VCRL SZ 1 L36IN ABSRB UD CTX L48MM 1/2

## (undated) DEVICE — SOLUTION IRRIG 1000ML 0.9% NACL USP BTL

## (undated) DEVICE — HOOD STERI-SHIELD 408-800-000

## (undated) DEVICE — TRAY PREP WET SKIN 4 COMPARTMENT 6 SPNG 2 TWL

## (undated) DEVICE — SYRINGE MED IRRIG 20ML POLYPR BRL STD LL TIP

## (undated) DEVICE — SUTURE MCRYL SZ 3-0 L27IN ABSRB UD L24MM PS-1

## (undated) DEVICE — GLOVE GAMMEX PI HYBRID LF 6.5

## (undated) DEVICE — Device

## (undated) DEVICE — GLOVE GAMMEX PI HYBRID LF 6.0

## (undated) DEVICE — SOLUTION IRRIG 3000ML 0.9% NACL FLX CONT

## (undated) DEVICE — STRIP SKIN CLSR W12XL100MM MICROPOROUS NWVN

## (undated) DEVICE — DRAPE SRG 70X60IN SPLT U IMPRV

## (undated) DEVICE — MANIFOLD SCTN INTELLICART BLUE

## (undated) DEVICE — COVER MAYO STD REG W23XL54IN STD TELSCP FLD

## (undated) DEVICE — APPLICATOR SKIN PREP 26ML HI LT ORNG 2% CHG

## (undated) DEVICE — CONTAINER SPEC 4OZ POLYPR GRAD SCR LID LEAK

## (undated) DEVICE — REAM VIP GLENOID AUGMNT  SM

## (undated) DEVICE — IMPLANTABLE DEVICE: Type: IMPLANTABLE DEVICE | Site: SHOULDER | Status: NON-FUNCTIONAL

## (undated) DEVICE — DRESS WOUND AQUACEL 3.5INX10IN

## (undated) DEVICE — DRESSING SURG W9XL25CM SIL SP CVR WTRPRF VIR

## (undated) DEVICE — CLEANER ESURG TIP 2X2IN CAUT

## (undated) DEVICE — TOWEL SURG OR 17X30IN BLUE

## (undated) DEVICE — HANDPIECE IRRIG BTTRY OPERATED TYP W/ SUCT HI

## (undated) DEVICE — BLADE SAW W1.14XL2.17IN THK0.025IN CUT

## (undated) DEVICE — GLOVE GAMMEX PI HYBRID LF 8.0

## (undated) DEVICE — DRAPE SRG 90X60IN BCK TBL CVR

## (undated) DEVICE — UNIVERS REVERS DRILL SURG 3MM

## (undated) DEVICE — SPONGE GZ 4XL4IN 100% COT 12 PLY TYP VII WVN